# Patient Record
Sex: MALE | Race: OTHER | Employment: UNEMPLOYED | URBAN - NONMETROPOLITAN AREA
[De-identification: names, ages, dates, MRNs, and addresses within clinical notes are randomized per-mention and may not be internally consistent; named-entity substitution may affect disease eponyms.]

---

## 2022-06-18 ENCOUNTER — HOSPITAL ENCOUNTER (EMERGENCY)
Age: 37
Discharge: HOME | End: 2022-06-18
Attending: EMERGENCY MEDICINE
Payer: COMMERCIAL

## 2022-06-18 VITALS
OXYGEN SATURATION: 99 % | DIASTOLIC BLOOD PRESSURE: 86 MMHG | HEART RATE: 84 BPM | BODY MASS INDEX: 25.9 KG/M2 | RESPIRATION RATE: 14 BRPM | HEIGHT: 67 IN | WEIGHT: 165 LBS | TEMPERATURE: 98.1 F | SYSTOLIC BLOOD PRESSURE: 126 MMHG

## 2022-06-18 DIAGNOSIS — Z76.89 ENCOUNTER FOR ASSESSMENT OF STD EXPOSURE: Primary | ICD-10-CM

## 2022-06-18 LAB
BACTERIA: ABNORMAL /HPF
BILIRUBIN URINE: NEGATIVE MG/DL
BLOOD, URINE: NEGATIVE
CAST TYPE: NEGATIVE /HPF
CLARITY: ABNORMAL
COLOR: YELLOW
CRYSTAL TYPE: NEGATIVE /HPF
EPITHELIAL CELLS, UA: ABNORMAL /HPF
GLUCOSE, URINE: NEGATIVE MG/DL
KETONES, URINE: NEGATIVE MG/DL
LEUKOCYTE ESTERASE, URINE: NEGATIVE
NITRITE URINE, QUANTITATIVE: NEGATIVE
PH, URINE: 6 (ref 5–8)
PROTEIN UA: NEGATIVE MG/DL
RBC URINE: NEGATIVE /HPF (ref 0–3)
SPECIFIC GRAVITY UA: 1.03 (ref 1–1.03)
UROBILINOGEN, URINE: 0.2 MG/DL (ref 0.2–1)
WBC UA: NEGATIVE /HPF (ref 0–2)

## 2022-06-18 PROCEDURE — 81001 URINALYSIS AUTO W/SCOPE: CPT

## 2022-06-18 PROCEDURE — 87591 N.GONORRHOEAE DNA AMP PROB: CPT

## 2022-06-18 PROCEDURE — 99284 EMERGENCY DEPT VISIT MOD MDM: CPT

## 2022-06-18 PROCEDURE — 96372 THER/PROPH/DIAG INJ SC/IM: CPT

## 2022-06-18 PROCEDURE — 6360000002 HC RX W HCPCS: Performed by: EMERGENCY MEDICINE

## 2022-06-18 PROCEDURE — 87491 CHLMYD TRACH DNA AMP PROBE: CPT

## 2022-06-18 PROCEDURE — 6370000000 HC RX 637 (ALT 250 FOR IP): Performed by: EMERGENCY MEDICINE

## 2022-06-18 RX ORDER — CEFTRIAXONE 500 MG/1
500 INJECTION, POWDER, FOR SOLUTION INTRAMUSCULAR; INTRAVENOUS ONCE
Status: COMPLETED | OUTPATIENT
Start: 2022-06-18 | End: 2022-06-18

## 2022-06-18 RX ORDER — AZITHROMYCIN 250 MG/1
1000 TABLET, FILM COATED ORAL ONCE
Status: COMPLETED | OUTPATIENT
Start: 2022-06-18 | End: 2022-06-18

## 2022-06-18 RX ORDER — DIPHENHYDRAMINE HCL 25 MG
25 TABLET ORAL EVERY 6 HOURS PRN
COMMUNITY

## 2022-06-18 RX ADMIN — CEFTRIAXONE SODIUM 500 MG: 500 INJECTION, POWDER, FOR SOLUTION INTRAMUSCULAR; INTRAVENOUS at 11:31

## 2022-06-18 RX ADMIN — PENICILLIN G BENZATHINE AND PENICILLIN G PROCAINE 2.4 MILLION UNITS: 600000; 600000 INJECTION, SUSPENSION INTRAMUSCULAR at 11:30

## 2022-06-18 RX ADMIN — AZITHROMYCIN MONOHYDRATE 1000 MG: 250 TABLET ORAL at 11:31

## 2022-06-18 ASSESSMENT — PAIN - FUNCTIONAL ASSESSMENT: PAIN_FUNCTIONAL_ASSESSMENT: NONE - DENIES PAIN

## 2022-06-18 ASSESSMENT — LIFESTYLE VARIABLES: HOW OFTEN DO YOU HAVE A DRINK CONTAINING ALCOHOL: NEVER

## 2022-06-18 NOTE — ED TRIAGE NOTES
Arrived ambulatory with friend to room 4 for triage. Tolerated without difficulty. Bed in lowest position. Call light given.  Gowned for exam.

## 2022-06-18 NOTE — Clinical Note
Micki Chiang was seen and treated in our emergency department on 6/18/2022. He may return to work on 06/21/2022. If you have any questions or concerns, please don't hesitate to call.       Gianfranco Lee MD

## 2022-06-18 NOTE — ED PROVIDER NOTES
Emergency [facility] DEPARTMENT    Patient: Latasha Campbell  MRN: [id number]  : 1985  Date of Evaluation: 2022  ED Provider: Trevor Pepper MD    Chief Complaint       Chief Complaint   Patient presents with    Exposure to STD     States wants checked for an STD.  was with a girl and was told she had syphillis. States has been having unprotected sex. Denies bleeding or discharge from penis. Lenny Chavarria is a 40 y.o. male who presents to the emergency department for evaluation of concerns about possible exposure to STD. Patient reports being in usual state of health until 2 weeks ago. He states that he had unprotected sex with a young lady and states that he was later informed that she may have had syphilis. Patient denies any penile discharge, rashes, lymphadenopathy, sores, or intraoral lesions. He states he is never had a history of having STDs in the past and denies any allergies to any medications. ROS:     At least 10 systems reviewed and otherwise acutely negative except as in the [address] Past History   History reviewed. No pertinent past medical history.   Past Surgical History:   Procedure Laterality Date    APPENDECTOMY  2017     Social History     Socioeconomic History    Marital status: Single     Spouse name: None    Number of children: None    Years of education: None    Highest education level: None   Occupational History    None   Tobacco Use    Smoking status: Current Every Day Smoker     Packs/day: 1.00    Smokeless tobacco: Never Used   Vaping Use    Vaping Use: Every day    Substances: Nicotine   Substance and Sexual Activity    Alcohol use: Never    Drug use: Never    Sexual activity: Yes     Partners: Female   Other Topics Concern    None   Social History Narrative    None     Social Determinants of Health     Financial Resource Strain:     Difficulty of Paying Living Expenses: Not on file Food Insecurity:     Worried About Running Out of Food in the Last Year: Not on file    Marisa of Food in the Last Year: Not on file   Transportation Needs:     Lack of Transportation (Medical): Not on file    Lack of Transportation (Non-Medical): Not on file   Physical Activity:     Days of Exercise per Week: Not on file    Minutes of Exercise per Session: Not on file   Stress:     Feeling of Stress : Not on file   Social Connections:     Frequency of Communication with Friends and Family: Not on file    Frequency of Social Gatherings with Friends and Family: Not on file    Attends Yarsani Services: Not on file    Active Member of [address] [place] [address] [employer] or Organizations: Not on file    Attends Club or Organization Meetings: Not on file    Marital Status: Not on file   Intimate Partner Violence:     Fear of Current or Ex-Partner: Not on file    Emotionally Abused: Not on file    Physically Abused: Not on file    Sexually Abused: Not on file   Housing Stability:     Unable to Pay for Housing in the Last Year: Not on file    Number of [place] in the Last Year: Not on file    Unstable Housing in the Last Year: Not on file       Medications/Allergies     Previous Medications    DIPHENHYDRAMINE (BENADRYL) 25 MG TABLET    Take 25 mg by mouth every 6 hours as needed for Itching     No Known Allergies     Physical Exam       ED Triage Vitals [06/18/22 1047]   BP Temp Temp Source Heart Rate Resp SpO2 Height Weight   126/86 98.1 °F (36.7 °C) Oral 84 14 99 % 5' 7\" (1.702 m) 165 lb (74.8 kg)     GENERAL APPEARANCE: Awake and alert. Cooperative. No acute distress. HEAD: Normocephalic. Atraumatic. EYES: Sclera anicteric. Pupils equal round reactive to light extraocular movements are intact  ENT: Tolerates saliva. No trismus. Moist mucous membranes no intraoral lesions noted  NECK: Supple. Trachea midline. No meningismus  CARDIO: RRR. LUNGS: Respirations unlabored. CTAB. ABDOMEN: Soft. Non-distended. Non-tender. No tenderness in right upper quadrant or right lower quadrant to deep palpation   : Aguilar stage V male. Both testes are distended bilaterally. No lesions or chancre noted. No lymphadenopathy in the inguinal region. EXTREMITIES: No acute deformities. SKIN: Warm and dry. No erythema edema or rashes appreciated  NEUROLOGICAL:  Cranial nerves II through XII grossly intact. No gross facial drooping. Moves all 4 extremities spontaneously. PSYCHIATRIC: Normal mood. Alert and oriented x3. Diagnostics   Labs:  No results found for this visit on 06/18/22. Radiographs:  No results found. Procedures/EKG:       ED Course and MDM   In brief, Shanda Jimenez is a 40 y.o. male who presented to the emergency department for evaluation of concern for exposure to STD. Based on patient's history and physical would be concerned about possible STD exposure. I do not appreciate evidence of active infection at this time. There is no outward lesions or lymphadenopathy appreciated on examination no intraoral lesions that I am able to detect patient denies any symptoms such as fevers body aches headache neck pain or chills. Did have a long discussion with the patient. Advised him for prophylactic treatment for exposure including penicillin, ceftriaxone and azithromycin and we will send off GC and Chlamydia testing here. Did recommend the patient have close follow-up with the health department for evaluation of other STDs including syphilis and HIV he is in agreement with this plan. Recommend close follow-up with primary care physician or referral physician next 24 to 48 hours. Call to schedule appointment.     Return to the emergency department for abdominal pain, fevers, new rashes, penile discharge or any other concerning symptoms he did express verbal understanding of these instructions and does feel comfortable to be discharged home    ED Medication Orders (From admission, onward)    Start Ordered     Status Ordering Provider    06/18/22 1115 06/18/22 1107  cefTRIAXone (ROCEPHIN) injection 500 mg  ONCE        Question:  Antimicrobial Indications  Answer:  STD infection    Last MAR action: Given - by Karol Diaz on 06/18/22 at [address] [facility], [address] [facility]    06/18/22 1115 06/18/22 1107  azithromycin (ZITHROMAX) tablet 1,000 mg  ONCE        Question:  Antimicrobial Indications  Answer:  STD infection    Last MAR action: Given - by Karol Diaz on 06/18/22 at [address] [facility], [address] [facility]    06/18/22 1115 06/18/22 1107  penicillin G benzathine and procaine (BICILLIN C-R) [id number] UNIT per 2ML suspension 2.4 Million Units  ONCE        Question:  Antimicrobial Indications  Answer:  STD infection    Last MAR action: Given - by Karol Diaz on 06/18/22 at [address] [facility], [address] [facility]          Final Impression      1.  Encounter for assessment of STD exposure      DISPOSITION           (Please note that portions of this note may have been completed with a voice recognition program. Efforts were made to edit the dictations but occasionally words are mis-transcribed.)    Cooper Monroy MD  [address] [facility]       Cooper Monroy MD  06/18/22 1099

## 2022-06-22 LAB
CHLAMYDIA TRACHOMATIS AMPLIFIED DET: NEGATIVE
N GONORRHOEAE AMPLIFIED DET: NEGATIVE